# Patient Record
Sex: MALE | Race: WHITE | Employment: FULL TIME | URBAN - NONMETROPOLITAN AREA
[De-identification: names, ages, dates, MRNs, and addresses within clinical notes are randomized per-mention and may not be internally consistent; named-entity substitution may affect disease eponyms.]

---

## 2018-12-21 ENCOUNTER — INITIAL CONSULT (OUTPATIENT)
Dept: PULMONOLOGY | Age: 53
End: 2018-12-21
Payer: COMMERCIAL

## 2018-12-21 VITALS
BODY MASS INDEX: 34.46 KG/M2 | WEIGHT: 283 LBS | OXYGEN SATURATION: 96 % | HEART RATE: 49 BPM | SYSTOLIC BLOOD PRESSURE: 134 MMHG | DIASTOLIC BLOOD PRESSURE: 86 MMHG | HEIGHT: 76 IN

## 2018-12-21 DIAGNOSIS — R06.83 SNORING: Primary | ICD-10-CM

## 2018-12-21 DIAGNOSIS — I48.91 ATRIAL FIBRILLATION, UNSPECIFIED TYPE (HCC): ICD-10-CM

## 2018-12-21 DIAGNOSIS — R06.81 APNEA: ICD-10-CM

## 2018-12-21 DIAGNOSIS — I10 ESSENTIAL HYPERTENSION: ICD-10-CM

## 2018-12-21 PROCEDURE — 99204 OFFICE O/P NEW MOD 45 MIN: CPT | Performed by: INTERNAL MEDICINE

## 2018-12-21 RX ORDER — LISINOPRIL 20 MG/1
20 TABLET ORAL DAILY
COMMUNITY

## 2018-12-21 RX ORDER — HYDROCHLOROTHIAZIDE 25 MG/1
25 TABLET ORAL DAILY
COMMUNITY

## 2018-12-21 NOTE — PROGRESS NOTES
Chief Complaint: Daina Pinal is here for a Snoring, A-fib, Witnessed Apnea Episodes     Mallampati airway Class: 2  Neck Circumference: 19 Inches    [place] sleepiness score 12/21/18: 6

## 2018-12-21 NOTE — PROGRESS NOTES
Occasionally. History of palpitations during night time/nocturnal awakenings: No  History of sweating during night time/nocturnal awakenings: No    General:  History of head injury in the past: No.   History of seizures: No.   Rest less legs syndrome symptoms:NO  History suggestive of periodic limb movements during sleep: NO  History suggestive of hypnagogic hallucinations: NO  History suggestive of hypnopompic hallucinations: NO  History suggestive of sleep talking:NO  History suggestive of sleep walking:NO  History suggestive of bruxism: NO.   History suggestive of cataplexy: NO  History suggestive of sleep paralysis: NO    Family history of sleep disorders:  Family history of obstructive sleep apnea: [x]  No     Family history of Narcolepsy:  [x]  No     Family history of Rest less legs syndrome :  [x]  No          History regarding old sleep studies:  Prior history of sleep study: No.  Using CPAP device: No.  Currently using home Oxygen: NO.      Patient considerations:  Is the patient is ambulatory: Yes  Patient is currently using: None of these Wheelchair, Vicie Grit or [employer]. Para/Quadriplegic: NO  Hearing deficit : NO  Claustrophobic: NO  MDD : NO  Blind: NO  Incontinent: NO  Para/Quadraplegi: NO.   Need transportation to and from Sleep Center:NO      Social History:  Social History   Substance Use Topics    Smoking status: Never Smoker    Smokeless tobacco: Never Used    Alcohol use Not on file   . He is currently working: Yes. He is currently working for [employer] during day time. He usually goes to his work at:  7:00AM.   He completes his work at:  3:30PM.                          No past medical history on file. No past surgical history on file.     No Known Allergies    Current Outpatient Prescriptions   Medication Sig Dispense Refill    metoprolol tartrate (LOPRESSOR) 25 MG tablet Take 25 mg by mouth 2 times daily      apixaban (ELIQUIS) 5 MG TABS tablet Take by mouth 2 times Patient exhibits no tenderness. Abdominal: Soft. Patient exhibits no distension. No tenderness. Musculoskeletal: Normal range of motion. Extremities: Patient exhibits no edema and no tenderness. Lymphadenopathy:  No cervical adenopathy. Neurological: Patient is alert and oriented to person, place, and time. Skin: Skin is warm and dry. Patient is not diaphoretic. Psychiatric: Patient  has a normal mood and affect. Patient behavior is normal.     Diagnostic Data:  None related sleep. Assessment:  -Snoring with witnessed apneas,frequent nocturnal awakenings and excessive daytime sleepiness to evaluate for obstructive sleep apnea. -Inadequate sleep hygiene.  -Hypertension on meds- under control.  -Hx of Atrial fibrillation S/p Cardioversion by Dr. Shade Mclean MD in [facility]. He is on treatment with Eliquis. -Bradycardia most likely due to his current medications I.e Metoprolol. He remain asymptomtic      Recommendations/Plan:  - Schedule patient for nocturnal polysomnogram (Sleep study) at [facility] sleep lab as per the patient's request. Patient to follow with my sleep [id number] after sleep study.  -I had a discussion with patient regarding avialable treatment options for his sleep disorder breathing including but not limited to CPAP titration in the sleep lab Vs.Dental appliance placement with referral to a local dentist Vs other available surgical options including Uvulopalatopharyngoplasty, maxillomandibular ostomy and tracheostomy as last option. At the end of discussion, he is not decided on his   treatment if he found to have obstructive sleep apnea at this time.  -We will see Adam Savage back in 1week after the sleep study to go over the sleep study results and further management options.  -He was educated to practice good sleep hygiene practices.  He  was provided with a good sleep hygiene hand out.  -Dheeraj Tomas was advised to make earlier appointment with my clinic if he

## 2019-01-07 ENCOUNTER — TELEPHONE (OUTPATIENT)
Dept: PULMONOLOGY | Age: 54
End: 2019-01-07

## 2019-01-14 DIAGNOSIS — G47.30 SLEEP APNEA, UNSPECIFIED TYPE: Primary | ICD-10-CM

## 2019-02-01 DIAGNOSIS — G47.30 SLEEP APNEA, UNSPECIFIED TYPE: ICD-10-CM

## 2019-02-06 ENCOUNTER — TELEPHONE (OUTPATIENT)
Dept: PULMONOLOGY | Age: 54
End: 2019-02-06

## 2019-02-06 ENCOUNTER — OFFICE VISIT (OUTPATIENT)
Dept: PULMONOLOGY | Age: 54
End: 2019-02-06
Payer: COMMERCIAL

## 2019-02-06 VITALS
SYSTOLIC BLOOD PRESSURE: 128 MMHG | WEIGHT: 280.2 LBS | DIASTOLIC BLOOD PRESSURE: 72 MMHG | OXYGEN SATURATION: 96 % | HEART RATE: 62 BPM | HEIGHT: 76 IN | BODY MASS INDEX: 34.12 KG/M2

## 2019-02-06 DIAGNOSIS — G47.33 OSA (OBSTRUCTIVE SLEEP APNEA): Primary | ICD-10-CM

## 2019-02-06 PROCEDURE — 99214 OFFICE O/P EST MOD 30 MIN: CPT | Performed by: INTERNAL MEDICINE

## 2019-04-21 NOTE — PROGRESS NOTES
[facility]                           Sleep Medicine clinic  Follow up for Sleep Apnea    Kathia Bah      Chief Complaint: Evan Maldonado is here for a 2 month follow up with a download                                           Chief complaint and [place]: Kathia Bah is a 53 y. o.oldmale came for follow up regarding his sleep apnea. He is currently using his positive airway pressure device with with a Minimum CPAP of 6cm H20 and Maximum CPAP of 20cm H20. He denies any problems with machine or mask. He is sleeping well at night with out difficulty. He denies any daytime sleepiness. Review of Systems:   General/Constitutional: he lost 3 lbs of weight from the last visit with normal appetite. No fever or chills. HENT: Negative. Eyes: Negative. Upper respiratory tract: No nasal stuffiness or post nasal drip. Lower respiratory tract/ lungs: No cough or sputum production. No hemoptysis. Cardiovascular: No palpitations or chest pain. Gastrointestinal: No nausea or vomiting. Neurological: No focal neurologiacal weakness. Extremities: No edema. Musculoskeletal: No complaints. Genitourinary: No complaints. Hematological: Negative. Psychiatric/Behavioral: Negative. Skin: No itching. No past medical history on file. No past surgical history on file.     Social History     Tobacco Use    Smoking status: Never Smoker    Smokeless tobacco: Never Used   Substance Use Topics    Alcohol use: Not on file    Drug use: Not on file       No Known Allergies    Current Outpatient Medications   Medication Sig Dispense Refill    metoprolol tartrate (LOPRESSOR) 25 MG tablet Take 50 mg by mouth 2 times daily       apixaban (ELIQUIS) 5 MG TABS tablet Take by mouth 2 times daily      hydrochlorothiazide (HYDRODIURIL) 25 MG tablet Take 25 mg by mouth daily      lisinopril (PRINIVIL;ZESTRIL) 20 MG tablet Take 20 mg by mouth daily      LANSOPRAZOLE PO Take by mouth       No current facility-administered medications for this visit. No family history on file. /70 (Site: Left Upper Arm, Position: Sitting, Cuff Size: Medium Adult)   Pulse 63   Ht 6' 4\" (1.93 m)   Wt 277 lb 12.8 oz (126 kg)   SpO2 96% Comment: on RA  BMI 33.81 kg/m²     BMI:  Body mass index is 33.81 kg/m². Mallampati airway Class:II  Neck Circumference:.19 Inches  [place] sleepiness score 5/2/19: 3  SAQLI: 86      Physical Exam :  Constitutional: Patient appears moderately built and moderately nourished. No distress. Patient is oriented to person, place, and time. HENT:   Head: Normocephalic and atraumatic. Right Ear: External ear normal.   Left Ear: External ear normal.   Mouth/Throat: Oropharynx is clear and moist.   Eyes: Conjunctivae are normal. Pupils are equal and reactive to light. No scleral icterus. Neck: Neck supple. No JVD present. Cardiovascular: Normal rate, regular rhythm, normal heart sounds. No murmur heard. Pulmonary/Chest: Effort normal and breath sounds normal. No stridor. No respiratory distress. No wheezes. No rales. Abdominal: Soft. Patient exhibits no distension. No tenderness. Musculoskeletal: Normal range of motion. Extremities:Patient exhibits no edema and no tenderness. Lymphadenopathy:  No cervical adenopathy. Neurological: Patient is alert and oriented to person, place, and time. Skin: Skin is warm and dry. Patient is not diaphoretic. Psychiatric: Patient  has a normal mood and affect. Diagnostic Data:    Echocardiogram: 11/15/18  EF: 50 to 51%- systolic function is normal.     Diagnostic Data:   PAP Download:   Recorded compliance dates:3/24/19-4/22/19  More than 4hour usage compliance was:97%.   Average residual Apnea- Hypoapnea index on current pressue was:1.0.       PAP Type AutoSet    Level  6/20 cmH20      Average usuage hours per day was:7:01        Interface: Nasal Pillows     Provider:  []BHARGAV                 []Miguelito [][employer]  [x][facility]                               [][facility]          []Other:     95th Percentile pressure: 13.3cm H20.     Assesment:  -Mild obstructive sleep apnea started on treatment with an auto CPAP with Minimum CPAP of 6cm H20 and Maximum CPAP of 20cm H20- he is using his CPAP device with excellent compliance to > hours of therapy. His respiratory events were under good control with current therapy. His clinical symptoms improved. He is benefiting from current CPAP therapy and would like to continue the therapy.  -Hypertension on meds- under control.  -Hx of Atrial fibrillation S/p Cardioversion by Dr. Paul Mclean MD in [facility]. He is on treatment with Eliquis. He is currently waiting for ablation for his Afib at [facility].     Recommendations/Plan:  -Will change his current auto CPAP settings to a fixed CPAP pressure of 14cm H20 with EPR of 2.  -He was advised to continue current positive airway pressure therapy with above described pressure.  -He advised to keep good compliance with current recommended pressure to get optimal results and clinical improvement.  -Follow with my clinic in 6months for clinical reevaluation with review of download. -He was advised to call [employer] regarding supplies if needed. -He was advised to call my office for earlier appointment if needed for worsening of sleep symptoms.  -He was advised to loose weight by controlling diet and doing exercise once cleared by his Cardiologist Dr. Jaylon Calderon and MD Jaja at [facility] [place] and his wife were educated about my impression and plan. Patient verbalizes understanding.

## 2019-05-02 ENCOUNTER — OFFICE VISIT (OUTPATIENT)
Dept: PULMONOLOGY | Age: 54
End: 2019-05-02
Payer: COMMERCIAL

## 2019-05-02 VITALS
OXYGEN SATURATION: 96 % | BODY MASS INDEX: 33.83 KG/M2 | SYSTOLIC BLOOD PRESSURE: 114 MMHG | DIASTOLIC BLOOD PRESSURE: 70 MMHG | WEIGHT: 277.8 LBS | HEIGHT: 76 IN | HEART RATE: 63 BPM

## 2019-05-02 DIAGNOSIS — Z99.89 OSA ON CPAP: Primary | ICD-10-CM

## 2019-05-02 DIAGNOSIS — G47.33 OSA ON CPAP: Primary | ICD-10-CM

## 2019-05-02 PROCEDURE — 99213 OFFICE O/P EST LOW 20 MIN: CPT | Performed by: INTERNAL MEDICINE

## 2019-05-02 NOTE — PROGRESS NOTES
Chief Complaint: Kyler Lees is here for a 2 month follow up with a download    Mallampati airway Class:II  Neck Circumference:.19 Inches    [place] sleepiness score 5/2/19: 3  SAQLI: 86      Diagnostic Data:   PAP Download:   Recorded compliance dates:3/24/19-4/22/19  More than 4hour usage compliance was:97%.   Average residual Apnea- Hypoapnea index on current pressue was:1.0.      PAP Type AutoSet    Level  6/20 cmH20     Average usuage hours per day was:7:01     Interface: Nasal Pillows    Provider:  [][facility]  [][facility]  [][employer]  [x][facility]         [][facility] []Other:

## 2019-05-02 NOTE — PATIENT INSTRUCTIONS
Recommendations/Plan:  -Will change his current auto CPAP settings to a fixed CPAP pressure of 14cm H20 with EPR of 2.  -He was advised to continue current positive airway pressure therapy with above described pressure.  -He advised to keep good compliance with current recommended pressure to get optimal results and clinical improvement.  -Follow with my clinic in 6months for clinical reevaluation with review of download. -He was advised to call [employer] regarding supplies if needed. -He was advised to call my office for earlier appointment if needed for worsening of sleep symptoms.  -He was advised to loose weight by controlling diet and doing exercise once cleared by his Cardiologist Dr. Chuck Ross and MD Jaja at [facility] and his wife were educated about my impression and plan. Patient verbalizes understanding.

## 2019-11-13 ENCOUNTER — OFFICE VISIT (OUTPATIENT)
Dept: PULMONOLOGY | Age: 54
End: 2019-11-13
Payer: COMMERCIAL

## 2019-11-13 VITALS
WEIGHT: 285.6 LBS | DIASTOLIC BLOOD PRESSURE: 70 MMHG | OXYGEN SATURATION: 97 % | BODY MASS INDEX: 34.78 KG/M2 | SYSTOLIC BLOOD PRESSURE: 128 MMHG | HEIGHT: 76 IN | HEART RATE: 63 BPM

## 2019-11-13 DIAGNOSIS — G47.33 OSA ON CPAP: Primary | ICD-10-CM

## 2019-11-13 DIAGNOSIS — Z99.89 OSA ON CPAP: Primary | ICD-10-CM

## 2019-11-13 PROCEDURE — 99213 OFFICE O/P EST LOW 20 MIN: CPT | Performed by: NURSE PRACTITIONER

## 2020-11-11 ENCOUNTER — OFFICE VISIT (OUTPATIENT)
Dept: PULMONOLOGY | Age: 55
End: 2020-11-11
Payer: COMMERCIAL

## 2020-11-11 VITALS
HEART RATE: 59 BPM | BODY MASS INDEX: 36.46 KG/M2 | DIASTOLIC BLOOD PRESSURE: 68 MMHG | SYSTOLIC BLOOD PRESSURE: 126 MMHG | OXYGEN SATURATION: 99 % | HEIGHT: 76 IN | WEIGHT: 299.4 LBS

## 2020-11-11 PROCEDURE — 99213 OFFICE O/P EST LOW 20 MIN: CPT | Performed by: NURSE PRACTITIONER

## 2020-11-11 NOTE — PROGRESS NOTES
[place] and [address] [place] [address]         [id number]  11/11/2020   Chief Complaint   Patient presents with    Follow-up     ROBERT 1 year follow up with a download        Pt of Dr. Andrew Diaz    PAP Download:   Original or initial AHI: 10   Date of initial study: 1/29/19  Weight of initial study: 283  [x] Compliant  100%   [] Noncompliant 0%     PAP Type CPAP Level  15 cmH20   Avg Hrs/Day 6:52  AHI: 0.5   Recorded compliance dates, 10/10/20 to 11/08/20   Machine/Mfg: ResMed  Interface: Nasal Pillow    Provider:  [][facility]  []Miguelito [][employer]  [x][facility]         [][facility] []Other:     Neck Size: 19  Mallampati Mallampati 2  ESS:  3  SAQLI 90    Here is a scan of the most recent download:            Presentation:   Martita Baig presents for sleep medicine follow up for obstructive sleep apnea. Since the last visit, Martita Baig continues to do well with treatment, no complaints. Changed from APAP to CPAP 15 last visit, tolerating well. No recurrence of AF. BP controlled. Equipment issues: The pressure is acceptable, the mask is acceptable and he is using the humidity. Sleep issues:  Do you feel better? Yes  More rested? Yes   Better concentration? yes    Progress History:   Since last visit any new medical issues? No  New ER or hospitlal visits? No  Any new or changes in medicines? No  Any new sleep medicines? No      Past Medical History:   Diagnosis Date    Atrial fibrillation ([address]     Benign essential HTN     ROBERT on CPAP        History reviewed. No pertinent surgical history. Social History     Tobacco Use    Smoking status: Never Smoker    Smokeless tobacco: Never Used   Substance Use Topics    Alcohol use: Not on file    Drug use: Not on file       No Known Allergies    Current Outpatient Medications   Medication Sig Dispense Refill    CPAP Machine MISC by Does not apply route Please change APAP to CPAP pressure of 15 cm H20. Repeat download 4 weeks.  1 each 0    metoprolol tartrate (LOPRESSOR) 25 MG tablet Take 50 mg by mouth 2 times daily       apixaban (ELIQUIS) 5 MG TABS tablet Take by mouth 2 times daily      hydrochlorothiazide (HYDRODIURIL) 25 MG tablet Take 25 mg by mouth daily      lisinopril (PRINIVIL;ZESTRIL) 20 MG tablet Take 20 mg by mouth daily      LANSOPRAZOLE PO Take by mouth       No current facility-administered medications for this visit. No family history on file. Review of Systems   General/Constitutional: No recent loss of weight or appetite changes. No fever or chills. HENT: Negative. Eyes: Negative. Upper respiratory tract: No nasal stuffiness or post nasal drip. Lower respiratory tract/ lungs: No cough or sputum production. No hemoptysis. Cardiovascular: No palpitations or chest pain. Gastrointestinal: No nausea or vomiting. Neurological: No focal neurologiacal weakness. Extremities: No edema. Musculoskeletal: No complaints. Genitourinary: No complaints. Hematological: Negative. Psychiatric/Behavioral: Negative. Skin: No itching. Physical Exam:    BMI: Body mass index is 36.44 kg/m². Wt Readings from Last 3 Encounters:   11/11/20 299 lb 6.4 oz (135.8 kg)   11/13/19 285 lb 9.6 oz (129.5 kg)   05/02/19 277 lb 12.8 oz (126 kg)     Weight gain of 14 lbs since last seen, 16 lbs since study  Vitals: /68 (Site: Right Upper Arm, Position: Sitting, Cuff Size: Large Adult)   Pulse 59   Ht 6' 4\" (1.93 m)   Wt 299 lb 6.4 oz (135.8 kg)   SpO2 99% Comment: on room air at rest  BMI 36.44 kg/m²         General Appearance - Moderately built, moderately nourished, in no acute distress. HEENT - Head is normocephalic, atraumatic. PERRL. Oral mucosa pink and moist, no oral thrush. Mallampati Score - II (soft palate, uvula, fauces visible). Neck - Supple, symmetrical, trachea midline and soft. Lungs - Clear to auscultation, no wheezes, rales or rhonchi, aeration good.   Cardiovascular - Heart sounds are normal. Regular rhythm normal rate without murmur, gallop or rub. Abdomen - Soft, nontender, non-distended. Neurologic - Alert and oriented x 3. Skin - No bruising or bleeding. Extremities - No cyanosis, clubbing or edema. ASSESSMENT/DIAGNOSIS     Diagnosis Orders   1. ROBERT on CPAP  DME Order for CPAP as OP            Plan   Do you need any equipment today? Yes Rx for new supplies.  -Monitor weight. .    - He was advised to continue current positive airway pressure therapy with above described pressure. - He was advised to keep good compliance with current recommended pressure to get optimal results and clinical improvement.  - Recommend 7-9 hours of sleep with PAP treatment. - He was advised to call DME company regarding supplies if needed.   -He is to call my office for earlier appointment if needed for worsening of sleep symptoms.   - He was instructed on weight loss. Simone Mejia was educated about my impression and plan and verbalizes understanding. We will see Charles Jean back in: 1 year with download.     Electronically signed by DIGNA Flynn CNP on 11/11/2020 at 12:55 PM Cardiac

## 2021-11-17 ENCOUNTER — OFFICE VISIT (OUTPATIENT)
Dept: PULMONOLOGY | Age: 56
End: 2021-11-17
Payer: COMMERCIAL

## 2021-11-17 VITALS
OXYGEN SATURATION: 98 % | HEART RATE: 63 BPM | SYSTOLIC BLOOD PRESSURE: 128 MMHG | WEIGHT: 301.2 LBS | BODY MASS INDEX: 36.68 KG/M2 | DIASTOLIC BLOOD PRESSURE: 78 MMHG | HEIGHT: 76 IN | TEMPERATURE: 97.3 F

## 2021-11-17 DIAGNOSIS — G47.33 OSA ON CPAP: Primary | ICD-10-CM

## 2021-11-17 DIAGNOSIS — Z99.89 OSA ON CPAP: Primary | ICD-10-CM

## 2021-11-17 DIAGNOSIS — R63.5 WEIGHT GAIN: ICD-10-CM

## 2021-11-17 DIAGNOSIS — I48.91 ATRIAL FIBRILLATION, UNSPECIFIED TYPE (HCC): ICD-10-CM

## 2021-11-17 PROBLEM — I10 HTN (HYPERTENSION), BENIGN: Status: ACTIVE | Noted: 2018-11-30

## 2021-11-17 PROBLEM — E66.01 OBESITY, MORBID (HCC): Status: ACTIVE | Noted: 2018-11-30

## 2021-11-17 PROBLEM — I48.19 PERSISTENT ATRIAL FIBRILLATION (HCC): Status: ACTIVE | Noted: 2021-11-17

## 2021-11-17 PROBLEM — Z79.01 ANTICOAGULANT LONG-TERM USE: Status: ACTIVE | Noted: 2018-11-30

## 2021-11-17 PROBLEM — I50.32 CHRONIC DIASTOLIC CONGESTIVE HEART FAILURE (HCC): Status: ACTIVE | Noted: 2018-11-30

## 2021-11-17 PROBLEM — Z98.890 S/P ABLATION OF ATRIAL FIBRILLATION: Status: ACTIVE | Noted: 2019-02-28

## 2021-11-17 PROBLEM — Z86.79 S/P ABLATION OF ATRIAL FIBRILLATION: Status: ACTIVE | Noted: 2019-02-28

## 2021-11-17 PROBLEM — Z82.49 FAMILY HISTORY OF PREMATURE CAD: Status: ACTIVE | Noted: 2018-11-30

## 2021-11-17 PROCEDURE — 99214 OFFICE O/P EST MOD 30 MIN: CPT | Performed by: NURSE PRACTITIONER

## 2021-11-17 ASSESSMENT — ENCOUNTER SYMPTOMS
COUGH: 0
WHEEZING: 0
SHORTNESS OF BREATH: 0
VOMITING: 0
DIARRHEA: 0
NAUSEA: 0
EYES NEGATIVE: 1
ABDOMINAL PAIN: 0

## 2021-11-17 NOTE — PROGRESS NOTES
[facility]      Nj Bangura         [id number]  11/17/2021   Chief Complaint   Patient presents with    Follow-up     ROBERT 1 year sleep follow up with Francisco PATE         Pt of Dr. Chadnra CLARK Download:   Original or initial AHI: 10     Date of initial study: 1/29/19      Compliant  100%     Noncompliant 0 %     PAP Type airsense 10 Level  15 cmh2o    Avg Hrs/Day 7:12  AHI: 0.7   Recorded compliance dates 10/17/21-11/15/21   ,Machine/Mfg:   [x] ResMed    [] Respironics/Dreamstation   Interface:   [] Nasal    [x] Nasal pillows   [] FFM      Provider:      [] [facility]     []Miguelito     [] [employer]    [x] [address]    [] [place]               [] [facility]      [] [employer]    [] [address]      [] Other    Neck Size: 19  Mallampati Mallampati 2  ESS:  2  SAQLI: 88     Here is a scan of the most recent download:            Presentation:   Hedy Camacho presents for sleep medicine follow up for obstructive sleep apnea  Since the last visit, Hedy Camacho using his CPAP compliantly and no complaints. H/o AFIB controlled on Eliquis and Metoprolol tartrate    Equipment issues: The pressure is  acceptable, the mask is acceptable     Sleep issues:  Do you feel better? Yes  More rested? Yes   Better concentration? yes    Progress History:   Since last visit any new medical issues? No  New ER or hospital visits? No  Any new or changes in medicines? No  Any new sleep medicines? No    Review of Systems -   Review of Systems   Constitutional: Negative for activity change, appetite change, chills, fatigue, fever and unexpected weight change. HENT: Negative. Eyes: Negative. Respiratory: Negative for cough, shortness of breath and wheezing. Cardiovascular: Negative for chest pain, palpitations and leg swelling. Gastrointestinal: Negative for abdominal pain, diarrhea, nausea and vomiting. Genitourinary: Negative. Musculoskeletal: Negative. Skin: Negative.     Neurological: Negative. Hematological: Negative. Psychiatric/Behavioral: Positive for sleep disturbance. Physical Exam:    BMI:  Body mass index is 36.66 kg/m². Wt Readings from Last 3 Encounters:   11/17/21 (!) 301 lb 3.2 oz (136.6 kg)   11/11/20 299 lb 6.4 oz (135.8 kg)   11/13/19 285 lb 9.6 oz (129.5 kg)     Weight gained 15 lbs over 2 years   Vitals: /78 (Site: Left Upper Arm, Position: Sitting)   Pulse 63   Temp 97.3 °F (36.3 °C)   Ht 6' 4\" (1.93 m)   Wt (!) 301 lb 3.2 oz (136.6 kg)   SpO2 98% Comment: on RA  BMI 36.66 kg/m²       Physical Exam  Constitutional:       Appearance: Normal appearance. HENT:      Head: Normocephalic and atraumatic. Eyes:      Conjunctiva/sclera: Conjunctivae normal.   Pulmonary:      Effort: Pulmonary effort is normal. No tachypnea, bradypnea or respiratory distress. Neurological:      Mental Status: He is alert and oriented to person, place, and time. Psychiatric:         Attention and Perception: Attention normal.         Mood and Affect: Mood normal.         Speech: Speech normal.         Behavior: Behavior normal.         Thought Content: Thought content normal.         Cognition and Memory: Cognition normal.         Judgment: Judgment normal.           ASSESSMENT/DIAGNOSIS     Diagnosis Orders   1. ROBERT on CPAP  DME Order for CPAP as OP   2. Weight gain     3. Atrial fibrillation, unspecified type [facility])              Plan   Do you need any equipment today?  Yes - updated supply order printed  - Download reviewed and discussed with patient  - He  was advised to continue current positive airway pressure therapy with above described pressure due to controlled AHI and tolerance to current pressure    - He  advised to keep good compliance with current recommended pressure to get optimal results and clinical improvement  - Recommend 7-9 hours of sleep with PAP  - He was advised to call [facility] regarding supplies if needed.   -He call my office for earlier appointment if needed for worsening of sleep symptoms.   - He was instructed on weight loss, risk of worsening sleep apnea with weight gain   - Ryan Parsons was educated about my impression and plan. Patient verbalizesunderstanding.     We will see Will Perez back in: 1 year with download    Information added by my medical assistant/LPN was reviewed today    Electronically signed by DIGNA Ro CNP on 11/17/2021 at 10:03 AM

## 2022-11-16 ENCOUNTER — OFFICE VISIT (OUTPATIENT)
Dept: PULMONOLOGY | Age: 57
End: 2022-11-16
Payer: COMMERCIAL

## 2022-11-16 VITALS
TEMPERATURE: 98.9 F | DIASTOLIC BLOOD PRESSURE: 80 MMHG | OXYGEN SATURATION: 99 % | HEART RATE: 59 BPM | BODY MASS INDEX: 36.77 KG/M2 | SYSTOLIC BLOOD PRESSURE: 126 MMHG | WEIGHT: 302 LBS | HEIGHT: 76 IN

## 2022-11-16 DIAGNOSIS — Z99.89 OSA ON CPAP: Primary | ICD-10-CM

## 2022-11-16 DIAGNOSIS — I48.91 ATRIAL FIBRILLATION, UNSPECIFIED TYPE (HCC): ICD-10-CM

## 2022-11-16 DIAGNOSIS — E66.9 OBESITY (BMI 30-39.9): ICD-10-CM

## 2022-11-16 DIAGNOSIS — G47.33 OSA ON CPAP: Primary | ICD-10-CM

## 2022-11-16 PROCEDURE — 3078F DIAST BP <80 MM HG: CPT | Performed by: NURSE PRACTITIONER

## 2022-11-16 PROCEDURE — 99214 OFFICE O/P EST MOD 30 MIN: CPT | Performed by: NURSE PRACTITIONER

## 2022-11-16 PROCEDURE — 3074F SYST BP LT 130 MM HG: CPT | Performed by: NURSE PRACTITIONER

## 2022-11-16 NOTE — PROGRESS NOTES
[place] [facility]      Jimmy Shelby         [id number]  11/16/2022   Chief Complaint   Patient presents with    Follow-up     1 year ROBERT follow up         Pt of Dr. Al Lema    PAP Download:   Original or initial AHI: 10     Date of initial study: 01/29/20119    Compliant  100%     Noncompliant 0 %     PAP Type Cpap   Level  15 cmH2O    Avg Hrs/Day 6 hours 46 minutes   AHI: 0.8   Recorded compliance dates , 10/15/2022  to 11/13/2022   Machine/Mfg:   [x] ResMed    [] Respironics/Dreamstation   Interface:   [] Nasal    [x] Nasal pillows   [] FFM      Provider:      [] -HME     [][facility]     [] [employer]    [x] [address]    [] [place]               [] [facility]      [] [employer]    [] [address]      [] Other    Neck Size: 19  Mallampati Mallampati 2  ESS:  0  SAQLI: 85    Here is a scan of the most recent download:              Presentation:   Anabella Patel presents for 1 yearsleep medicine follow up for obstructive sleep apnea  Since the last visit, Anabella Patel using his PAP with great compliance. Is having leaks, states the pressure is so high leaking from seal.   Max leaks 37L/min       Equipment issues: The pressure is somewhat  acceptable, the mask is acceptable     Progress History:   Since last visit any new medical issues? No  Sleep Related Issues? No  New ER or hospital visits? No  Any new or changes in medicines? No  Any new sleep medicines? No    Review of Systems -   Review of Systems     Physical Exam:    BMI:  Body mass index is 36.76 kg/m². Wt Readings from Last 3 Encounters:   11/16/22 (!) 302 lb (137 kg)   11/17/21 (!) 301 lb 3.2 oz (136.6 kg)   11/11/20 299 lb 6.4 oz (135.8 kg)     Weight stable / unchanged  Vitals: /80   Pulse 59   Temp 98.9 °F (37.2 °C)   Ht 6' 4\" (1.93 m)   Wt (!) 302 lb (137 kg)   SpO2 99% Comment: r/a  BMI 36.76 kg/m²       Physical Exam      ASSESSMENT/DIAGNOSIS     Diagnosis Orders   1.  ROBERT on CPAP Controlled DME Order for CPAP as OP    CPAP Machine MISC    on CPAP       2. Atrial fibrillation, unspecified type ([address] Controlled                Plan   Do you need any equipment today? Yes -printed rx for supplies, faxed to patient's DME     - Download reviewed and discussed with patient, AHI well controlled. - He  has requested lower pressure due to leaks, rx to lower pressure to 13 cm H20. Message to [facility] staff to pull DL off cloud in 2 weeks to make sure AHI staying < 5.0   - He  advised to keep good compliance with current recommended pressure to get optimal results and clinical improvement  - Recommend 7-9 hours of sleep with PAP  - He was advised to call DME company regarding supplies if needed.   -He call my office for earlier appointment if needed for worsening of sleep symptoms.   - He was instructed on weight loss  -following with cardiology for Afib management  - Emili Stout was educated about my impression and plan. Patient verbalizesunderstanding.   We will see Peter Zhang back in: 1 year with download    Information added by my medical assistant/LPN was reviewed today    billing based on medical decision making     DGINA Fall-ALANNA   11/16/2022

## 2022-12-01 ENCOUNTER — TELEPHONE (OUTPATIENT)
Dept: PULMONOLOGY | Age: 57
End: 2022-12-01

## 2022-12-01 NOTE — TELEPHONE ENCOUNTER
----- Message from Sean Hampton, DIGNA - CNP sent at 11/16/2022  8:27 AM EST -----  Please pull Download after pressure change on 11/30 for me to review

## 2023-11-15 ENCOUNTER — OFFICE VISIT (OUTPATIENT)
Dept: PULMONOLOGY | Age: 58
End: 2023-11-15
Payer: COMMERCIAL

## 2023-11-15 VITALS
HEART RATE: 72 BPM | DIASTOLIC BLOOD PRESSURE: 76 MMHG | TEMPERATURE: 97.8 F | SYSTOLIC BLOOD PRESSURE: 126 MMHG | OXYGEN SATURATION: 97 % | WEIGHT: 308 LBS | BODY MASS INDEX: 37.51 KG/M2 | HEIGHT: 76 IN

## 2023-11-15 DIAGNOSIS — G47.33 OSA ON CPAP: Primary | ICD-10-CM

## 2023-11-15 DIAGNOSIS — E66.9 OBESITY (BMI 30-39.9): ICD-10-CM

## 2023-11-15 PROCEDURE — 3074F SYST BP LT 130 MM HG: CPT | Performed by: NURSE PRACTITIONER

## 2023-11-15 PROCEDURE — 99214 OFFICE O/P EST MOD 30 MIN: CPT | Performed by: NURSE PRACTITIONER

## 2023-11-15 PROCEDURE — 3078F DIAST BP <80 MM HG: CPT | Performed by: NURSE PRACTITIONER

## 2023-11-15 ASSESSMENT — ENCOUNTER SYMPTOMS
NAUSEA: 0
SHORTNESS OF BREATH: 0
COUGH: 0
EYES NEGATIVE: 1
VOMITING: 0
ABDOMINAL PAIN: 0
DIARRHEA: 0
WHEEZING: 0

## 2024-11-20 ENCOUNTER — OFFICE VISIT (OUTPATIENT)
Dept: PULMONOLOGY | Age: 59
End: 2024-11-20
Payer: COMMERCIAL

## 2024-11-20 VITALS
TEMPERATURE: 98.1 F | DIASTOLIC BLOOD PRESSURE: 86 MMHG | WEIGHT: 303 LBS | HEIGHT: 76 IN | HEART RATE: 69 BPM | BODY MASS INDEX: 36.9 KG/M2 | OXYGEN SATURATION: 95 % | SYSTOLIC BLOOD PRESSURE: 138 MMHG

## 2024-11-20 DIAGNOSIS — E66.01 OBESITY, MORBID: ICD-10-CM

## 2024-11-20 DIAGNOSIS — G47.33 OSA ON CPAP: Primary | ICD-10-CM

## 2024-11-20 PROCEDURE — 3075F SYST BP GE 130 - 139MM HG: CPT | Performed by: NURSE PRACTITIONER

## 2024-11-20 PROCEDURE — 3079F DIAST BP 80-89 MM HG: CPT | Performed by: NURSE PRACTITIONER

## 2024-11-20 PROCEDURE — 99214 OFFICE O/P EST MOD 30 MIN: CPT | Performed by: NURSE PRACTITIONER

## 2024-11-20 NOTE — PROGRESS NOTES
[facility]      Richy Nolen         [id number]  11/20/2024   Chief Complaint   Patient presents with    Follow-up     1 year ROBERT follow up with Francisco Gaines download.        Pt of Dr. Cervantes    PAP Download:   Original or initial AHI: 10     Date of initial study: 1/29/19      Compliant  97%     Noncompliant 3%     PAP Type CPAP Level  [id number]   Avg Hrs/Day 6 hours 31 minutes  AHI: 0.5   Leaks : 95 th percentile: 10.6   Recorded compliance dates 10/20/24-11/18/24   Machine/Mfg:   [x] ResMed    [] Respironics/Dreamstation   Interface:   [] Nasal    [x] Nasal pillows   [] FFM      Provider:      [] -NIRMALA     []Miguelito     [] Stacey    [x] Francisco Gaines    [] [facility]               [] [facility]      [] Adaptive    [] [place]:      [] Other    Neck Size: 19.5 inches  Mallampati 2  ESS:  2  SAQLI: 88    Here is a scan of the most recent download:              Presentation:   Richy presents for 1 yearsle medicine follow up for obstructive sleep apnea  Since the last visit, Richy is using CPAP with good compliance and continued benefit.   C/o the hose often getting in the way and sometimes pulling machine off table , he is asking about the Inspire       Progress History:   Since last visit any new medical issues? Yes- tooth pulled   Any trouble with Machine No  Any new sleep medicines? no      Equipment issues:  The pressure is  acceptable, the mask is acceptable     Review of Systems -   Review of Systems   HENT:  Positive for dental problem.    All other systems reviewed and are negative.       Physical Exam:    BMI:  Body mass index is 36.88 kg/m².    Wt Readings from Last 3 Encounters:   11/20/24 (!) 137.4 kg (303 lb)   11/15/23 (!) 139.7 kg (308 lb)   11/16/22 (!) 137 kg (302 lb)     Weight gained 20 lbs over the last 2 years since initial study   Vitals: /86 (Site: Right Upper Arm, Position: Sitting, Cuff Size: Medium Adult)   Pulse 69   Temp 98.1